# Patient Record
Sex: FEMALE | URBAN - METROPOLITAN AREA
[De-identification: names, ages, dates, MRNs, and addresses within clinical notes are randomized per-mention and may not be internally consistent; named-entity substitution may affect disease eponyms.]

---

## 2021-09-30 ENCOUNTER — OFFICE VISIT (OUTPATIENT)
Dept: URBAN - METROPOLITAN AREA CLINIC 37 | Facility: CLINIC | Age: 51
End: 2021-09-30
Payer: COMMERCIAL

## 2021-09-30 DIAGNOSIS — E11.3292 TYPE 2 DIABETES MELLITUS WITH MILD NONPROLIFERATIVE DIABETIC RETINOPATHY WITHOUT MACULAR EDEMA, LEFT EYE: Primary | ICD-10-CM

## 2021-09-30 PROCEDURE — 99204 OFFICE O/P NEW MOD 45 MIN: CPT | Performed by: OPTOMETRIST

## 2021-09-30 ASSESSMENT — INTRAOCULAR PRESSURE
OD: 13
OS: 12

## 2021-09-30 ASSESSMENT — VISUAL ACUITY
OD: 20/20
OS: 20/20

## 2021-09-30 NOTE — IMPRESSION/PLAN
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 Plan: RTC 1 year for [address]. Recommend continued BS monitoring and scheduled visits with PCP/ENDO. 
RTC 6 weeks after BS stable for refraction